# Patient Record
Sex: FEMALE | Race: WHITE | NOT HISPANIC OR LATINO | Employment: OTHER | URBAN - METROPOLITAN AREA
[De-identification: names, ages, dates, MRNs, and addresses within clinical notes are randomized per-mention and may not be internally consistent; named-entity substitution may affect disease eponyms.]

---

## 2017-01-11 ENCOUNTER — OFFICE VISIT (OUTPATIENT)
Dept: CARDIOLOGY | Facility: CLINIC | Age: 82
End: 2017-01-11

## 2017-01-11 VITALS
SYSTOLIC BLOOD PRESSURE: 146 MMHG | BODY MASS INDEX: 18.95 KG/M2 | DIASTOLIC BLOOD PRESSURE: 74 MMHG | HEART RATE: 77 BPM | HEIGHT: 62 IN | WEIGHT: 103 LBS

## 2017-01-11 DIAGNOSIS — I48.0 PAROXYSMAL ATRIAL FIBRILLATION (HCC): Primary | ICD-10-CM

## 2017-01-11 DIAGNOSIS — E78.00 HYPERCHOLESTEROLEMIA: ICD-10-CM

## 2017-01-11 DIAGNOSIS — I10 ESSENTIAL HYPERTENSION: ICD-10-CM

## 2017-01-11 DIAGNOSIS — I45.9 CONDUCTION DISORDER OF THE HEART: ICD-10-CM

## 2017-01-11 PROCEDURE — 99213 OFFICE O/P EST LOW 20 MIN: CPT | Performed by: INTERNAL MEDICINE

## 2017-01-11 PROCEDURE — 93288 INTERROG EVL PM/LDLS PM IP: CPT | Performed by: INTERNAL MEDICINE

## 2017-01-11 NOTE — PROGRESS NOTES
Kristina Muhammad  12/3/1927  [phone]  [phone]    01/11/2017    Anais Almeida MD    Chief Complaint   Patient presents with   • Atrial Fibrillation     PROBLEM LIST:  1. Atrial fibrillation:  a. Abnormal EKG, 06/19/2012, revealing atrial fibrillation at a rate of 104.  b. Initiation of metoprolol  ER 25 mg on 06/19/2012.  c. EKG, 07/09/2012, revealing sinus bradycardia with a first-degree AV block and a right bundle branch block at a rate of 46.  2.  Conduction system disease:  a. Third-degree AV block, 08/31/2014.  b.  Presentation to [facility] where she had implantation of [employer] medical [employer]  2240 pacemaker, data deficit.    c. Cardiolite PET scan, 09/11/2014:  LVEF 70% with no evidence of ischemia.    d. Echocardiogram, 09/01/2014, Dr. Montelongo:   LVEF 77% with moderate MR.  3. Hypertension.  4. Hyperlipidemia.  5.  Hypothyroidism, on chronic replacement therapy.  6. Osteoporosis.  7. Dermatomyositis.  8. Depression.  9. Status post right colectomy in 1991.  10. Status post basal cell carcinoma removal from the face.      Allergies   Allergen Reactions   • Propafenone      150 mg every 12 hours, fatigue.       Current Medications:      Current Outpatient Prescriptions:   •  amLODIPine (NORVASC) 5 MG tablet, TK 1 T PO  QD, Disp: , Rfl: 1  •  levothyroxine (SYNTHROID, LEVOTHROID) 75 MCG tablet, TK 1 T PO QD, Disp: , Rfl: 3  •  losartan (COZAAR) 100 MG tablet, TAKE 1 TABLET BY MOUTH DAILY, Disp: 90 tablet, Rfl: 0  •  Multiple Minerals-Vitamins (CITRACAL PLUS BONE DENSITY PO), Take by mouth daily, Disp: , Rfl:   •  Multiple Vitamins-Minerals (CENTRUM SILVER ADULT 50+) tablet, Take by mouth daily, Disp: , Rfl:   •  propafenone (RHTHYMOL) 150 MG tablet, TAKE 1/2 TABLET BY MOUTH TWICE DAILY, Disp: 90 tablet, Rfl: 1  •  XARELTO 15 MG tablet, TAKE 1 TABLET BY MOUTH DAILY, Disp: 90 tablet, Rfl: 1    HPI    Ms. Muhammad presents today for [employer] device interrogation and 6 month follow up for  "atrial fibrillation, conduction system disease, hypertension, and hyperlipidemia . Since last visit, she has been doing very well and is without cardiac comlaints. She denies chest pain, palpitations, shortness of breath, edema, PND, orthopnea, dizziness, and syncope. She walks when it is warm and tries to stay as active as she can in the winter months.  Her interrogation today was unremarkable.  Since she did not have an EKG last visit, we will obtain one today to measure her intervals since she is on Rhythmol.      The following portions of the patient's history were reviewed and updated as appropriate: allergies, current medications and problem list.    Pertinent positives as listed in the HPI.  All other systems reviewed are negative.    Vitals:    01/11/17 1335   BP: 146/74   BP Location: Right arm   Patient Position: Sitting   Pulse: 77   Weight: 103 lb (46.7 kg)   Height: 62\" (157.5 cm)       General: Alert and oriented  Neck: Jugular venous pressure is within normal limits. Carotids have normal upstrokes without bruits.   Cardiovascular: Heart has a nondisplaced focal PMI. Regular rate and rhythm without murmur, gallop or rub.  Lungs: Clear without rales or wheezes. Equal expansion is noted.   Extremities: Show no edema.  Skin: warm and dry.    Diagnostic Data:    ECG 12 Lead  Date/Time: 1/11/2017 4:42 PM  Performed by: LOC MILLS  Authorized by: LOC MILLS   Comparison: compared with previous ECG from 5/11/2014  Comparison to previous ECG: QRS slightly wider, but stable  Rhythm: sinus rhythm  Rate: normal  BPM: 74  Clinical impression: abnormal ECG  Comments: 1st degree AV block, RBBB         DEVICE INTERROGATION:  1/11/2017, [employer] Assurity: RA pacing 22%, RV pacing 9.5%. P wave is 1.0 mV with a threshold of 0.75 V at 0.5 msec and an impedance of 400 ohms. R wave is >12 mV with a threshold of 0.75 V at 0.5 msec and an impedance of 600 ohms. Battery voltage is 3.02V at 11.7 " years.      Assessment:      ICD-10-CM ICD-9-CM   1. Paroxysmal atrial fibrillation-stable I48.0 427.31   2. Conduction system disease I45.9 426.9   3. Essential hypertension- controlled; no change from last visit I10 401.9   4. Hypercholesterolemia- per PCP E78.00 272.0       Plan:    1. Continue current medications.  2. F/up in 6 months with [employer] and EKG or sooner if needed.    Scribed for Lori Mejia MD by PETTY Miller. 1/11/2017  1:43 PM        I Lori Mejia MD personally performed the services described in this documentation as scribed by the above individual in my presence, and it is both accurate and complete.

## 2017-01-11 NOTE — MR AVS SNAPSHOT
Kristina GALVAN Muhammad   1/11/2017 1:45 PM   Office Visit    Dept Phone:  [phone]   Encounter #:  [id number]    Provider:  Lori Mejia MD   Department:  [facility] CARDIOLOGY                Your Full Care Plan              Your Updated Medication List          This list is accurate as of: 1/11/17  2:39 PM.  Always use your most recent med list.                amLODIPine 5 MG tablet   Commonly known as:  NORVASC       CENTRUM SILVER ADULT 50+ tablet       CITRACAL PLUS BONE DENSITY PO       levothyroxine 75 MCG tablet   Commonly known as:  SYNTHROID, LEVOTHROID       losartan 100 MG tablet   Commonly known as:  COZAAR   TAKE 1 TABLET BY MOUTH DAILY       propafenone 150 MG tablet   Commonly known as:  RHTHYMOL   TAKE 1/2 TABLET BY MOUTH TWICE DAILY       XARELTO 15 MG tablet   Generic drug:  rivaroxaban   TAKE 1 TABLET BY MOUTH DAILY               You Were Diagnosed With        Codes Comments    Paroxysmal atrial fibrillation    -  Primary ICD-10-CM: I48.0  ICD-9-CM: 427.31     Conduction disorder of the heart     ICD-10-CM: I45.9  ICD-9-CM: 426.9     Essential hypertension     ICD-10-CM: I10  ICD-9-CM: 401.9     Hypercholesterolemia     ICD-10-CM: E78.00  ICD-9-CM: 272.0       Instructions     None    Patient Instructions History      Upcoming Appointments     Visit Type Date Time Department    FOLLOW UP 1/11/2017  1:45 PM [facility]    FOLLOW UP 7/27/2017  1:30 PM [facility]      MyChart Signup     Our records indicate that you have declined [facility] ePatientFinderhart signup. If you would like to sign up for ePatientFinderhart, please email [email or web address] or call [phone] to obtain an activation code.             Other Info from Your Visit           Your Appointments     Jul 27, 2017  1:30 PM EDT   Follow Up with Lori Mejia MD   [facility] CARDIOLOGY (--)    [address] [place], [place] [address]  "[address] [place] [id number] [phone]           Arrive 15 minutes prior to appointment.              Allergies     Propafenone      150 mg every 12 hours, fatigue.      Reason for Visit     Atrial Fibrillation           Vital Signs     Blood Pressure Pulse Height Weight Body Mass Index Smoking Status    146/74 (BP Location: Right arm, Patient Position: Sitting) 77 62\" (157.5 cm) 103 lb (46.7 kg) 18.84 kg/m2 Never Smoker      Problems and Diagnoses Noted     Atrial fibrillation (irregular heartbeat)    Conduction disorder of the heart    High blood pressure    High cholesterol            "

## 2017-01-11 NOTE — LETTER
January 11, 2017     Anais Almeida MD  [address]  [address]  [facility] [address]    Patient: Kristina Muhammad   YOB: 1927   Date of Visit: 1/11/2017       Dear Dr. Juan Pablo MD:    Thank you for referring Kristina Muhammad to me for evaluation. Below are the relevant portions of my assessment and plan of care.    If you have questions, please do not hesitate to call me. I look forward to following Kristina along with you.         Sincerely,        Lori Mejia MD        CC: No Recipients  Lori Mejia MD  1/12/2017  4:32 PM  Signed  Kristina Muhammad  12/3/1927  [phone]  [phone]    01/11/2017    Anais Almeida MD    Chief Complaint   Patient presents with   • Atrial Fibrillation     PROBLEM LIST:  1. Atrial fibrillation:  a. Abnormal EKG, 06/19/2012, revealing atrial fibrillation at a rate of 104.  b. Initiation of metoprolol  ER 25 mg on 06/19/2012.  c. EKG, 07/09/2012, revealing sinus bradycardia with a first-degree AV block and a right bundle branch block at a rate of 46.  2.  Conduction system disease:  a. Third-degree AV block, 08/31/2014.  b.  Presentation to [facility] where she had implantation of [employer] medical [employer]  2240 pacemaker, data deficit.    c. Cardiolite PET scan, 09/11/2014:  LVEF 70% with no evidence of ischemia.    d. Echocardiogram, 09/01/2014, Dr. Montelongo:   LVEF 77% with moderate MR.  3. Hypertension.  4. Hyperlipidemia.  5.  Hypothyroidism, on chronic replacement therapy.  6. Osteoporosis.  7. Dermatomyositis.  8. Depression.  9. Status post right colectomy in 1991.  10. Status post basal cell carcinoma removal from the face.      Allergies   Allergen Reactions   • Propafenone      150 mg every 12 hours, fatigue.       Current Medications:      Current Outpatient Prescriptions:   •  amLODIPine (NORVASC) 5 MG tablet, TK 1 T PO  QD, Disp: , Rfl: 1  •  levothyroxine (SYNTHROID, LEVOTHROID) 75 MCG tablet, TK 1 T PO QD,  "Disp: , Rfl: 3  •  losartan (COZAAR) 100 MG tablet, TAKE 1 TABLET BY MOUTH DAILY, Disp: 90 tablet, Rfl: 0  •  Multiple Minerals-Vitamins (CITRACAL PLUS BONE DENSITY PO), Take by mouth daily, Disp: , Rfl:   •  Multiple Vitamins-Minerals (CENTRUM SILVER ADULT 50+) tablet, Take by mouth daily, Disp: , Rfl:   •  propafenone (RHTHYMOL) 150 MG tablet, TAKE 1/2 TABLET BY MOUTH TWICE DAILY, Disp: 90 tablet, Rfl: 1  •  XARELTO 15 MG tablet, TAKE 1 TABLET BY MOUTH DAILY, Disp: 90 tablet, Rfl: 1    HPI    Ms. Muhammad presents today for [employer] device interrogation and 6 month follow up for atrial fibrillation, conduction system disease, hypertension, and hyperlipidemia . Since last visit, she has been doing very well and is without cardiac comlaints. She denies chest pain, palpitations, shortness of breath, edema, PND, orthopnea, dizziness, and syncope. She walks when it is warm and tries to stay as active as she can in the winter months.  Her interrogation today was unremarkable.  Since she did not have an EKG last visit, we will obtain one today to measure her intervals since she is on Rhythmol.      The following portions of the patient's history were reviewed and updated as appropriate: allergies, current medications and problem list.    Pertinent positives as listed in the HPI.  All other systems reviewed are negative.    Vitals:    01/11/17 1335   BP: 146/74   BP Location: Right arm   Patient Position: Sitting   Pulse: 77   Weight: 103 lb (46.7 kg)   Height: 62\" (157.5 cm)       General: Alert and oriented  Neck: Jugular venous pressure is within normal limits. Carotids have normal upstrokes without bruits.   Cardiovascular: Heart has a nondisplaced focal PMI. Regular rate and rhythm without murmur, gallop or rub.  Lungs: Clear without rales or wheezes. Equal expansion is noted.   Extremities: Show no edema.  Skin: warm and dry.    Diagnostic Data:    ECG 12 Lead  Date/Time: 1/11/2017 4:42 PM  Performed by: LOC MILLS " V  Authorized by: [facility]ARD   Comparison: compared with previous ECG from 5/11/2014  Comparison to previous ECG: QRS slightly wider, but stable  Rhythm: sinus rhythm  Rate: normal  BPM: 74  Clinical impression: abnormal ECG  Comments: 1st degree AV block, RBBB         DEVICE INTERROGATION:  1/11/2017, [employer] Ass[employer]: RA pacing 22%, RV pacing 9.5%. P wave is 1.0 mV with a threshold of 0.75 V at 0.5 msec and an impedance of 400 ohms. R wave is >12 mV with a threshold of 0.75 V at 0.5 msec and an impedance of 600 ohms. Battery voltage is 3.02V at 11.7 years.      Assessment:      ICD-10-CM ICD-9-CM   1. Paroxysmal atrial fibrillation-stable I48.0 427.31   2. Conduction system disease I45.9 426.9   3. Essential hypertension- controlled; no change from last visit I10 401.9   4. Hypercholesterolemia- per PCP E78.00 272.0       Plan:    1. Continue current medications.  2. F/up in 6 months with [employer] and EKG or sooner if needed.    Scribed for Lori Mejia MD by Loc Maynard, PETTY. 1/11/2017  1:43 PM        I Lori Mejia MD personally performed the services described in this documentation as scribed by the above individual in my presence, and it is both accurate and complete.

## 2017-01-25 RX ORDER — LOSARTAN POTASSIUM 100 MG/1
TABLET ORAL
Qty: 90 TABLET | Refills: 3 | Status: SHIPPED | OUTPATIENT
Start: 2017-01-25 | End: 2018-02-22 | Stop reason: DRUGHIGH

## 2017-04-14 RX ORDER — PROPAFENONE HYDROCHLORIDE 150 MG/1
TABLET, COATED ORAL
Qty: 90 TABLET | Refills: 0 | Status: SHIPPED | OUTPATIENT
Start: 2017-04-14 | End: 2017-07-12 | Stop reason: SDUPTHER

## 2017-04-26 ENCOUNTER — CLINICAL SUPPORT NO REQUIREMENTS (OUTPATIENT)
Dept: CARDIOLOGY | Facility: CLINIC | Age: 82
End: 2017-04-26

## 2017-04-26 DIAGNOSIS — I48.0 PAROXYSMAL ATRIAL FIBRILLATION (HCC): ICD-10-CM

## 2017-04-26 DIAGNOSIS — I45.9 CONDUCTION DISORDER OF THE HEART: ICD-10-CM

## 2017-04-26 PROCEDURE — 93294 REM INTERROG EVL PM/LDLS PM: CPT | Performed by: INTERNAL MEDICINE

## 2017-04-26 PROCEDURE — 93296 REM INTERROG EVL PM/IDS: CPT | Performed by: INTERNAL MEDICINE

## 2017-06-16 RX ORDER — RIVAROXABAN 15 MG/1
TABLET, FILM COATED ORAL
Qty: 90 TABLET | Refills: 0 | Status: SHIPPED | OUTPATIENT
Start: 2017-06-16 | End: 2017-09-11 | Stop reason: SDUPTHER

## 2017-07-13 RX ORDER — PROPAFENONE HYDROCHLORIDE 150 MG/1
TABLET, COATED ORAL
Qty: 90 TABLET | Refills: 0 | Status: SHIPPED | OUTPATIENT
Start: 2017-07-13 | End: 2017-10-10 | Stop reason: SDUPTHER

## 2017-07-27 ENCOUNTER — OFFICE VISIT (OUTPATIENT)
Dept: CARDIOLOGY | Facility: CLINIC | Age: 82
End: 2017-07-27

## 2017-07-27 VITALS
DIASTOLIC BLOOD PRESSURE: 80 MMHG | HEIGHT: 62 IN | BODY MASS INDEX: 19.47 KG/M2 | WEIGHT: 105.8 LBS | SYSTOLIC BLOOD PRESSURE: 154 MMHG | HEART RATE: 78 BPM

## 2017-07-27 DIAGNOSIS — I45.9 CONDUCTION DISORDER OF THE HEART: ICD-10-CM

## 2017-07-27 DIAGNOSIS — E78.2 MIXED HYPERLIPIDEMIA: ICD-10-CM

## 2017-07-27 DIAGNOSIS — I10 ESSENTIAL HYPERTENSION: ICD-10-CM

## 2017-07-27 DIAGNOSIS — I48.0 PAROXYSMAL ATRIAL FIBRILLATION (HCC): Primary | ICD-10-CM

## 2017-07-27 PROCEDURE — 99213 OFFICE O/P EST LOW 20 MIN: CPT | Performed by: INTERNAL MEDICINE

## 2017-07-27 PROCEDURE — 93288 INTERROG EVL PM/LDLS PM IP: CPT | Performed by: INTERNAL MEDICINE

## 2017-07-27 RX ORDER — AMLODIPINE BESYLATE 2.5 MG/1
2.5 TABLET ORAL DAILY
COMMUNITY

## 2017-07-27 NOTE — PROGRESS NOTES
Kristina Muhammad  12/3/1927  [phone]  [phone]    07/27/2017    Anais Almeida MD    Chief Complaint   Patient presents with   • Atrial Fibrillation       PROBLEM LIST:  1. Atrial fibrillation:  a. Abnormal EKG, 06/19/2012, revealing atrial fibrillation at a rate of 104.  b. Initiation of metoprolol  ER 25 mg on 06/19/2012.  c. EKG, 07/09/2012, revealing sinus bradycardia with a first-degree AV block and a right bundle branch block at a rate of 46.  2.  Conduction system disease:  a. Third-degree AV block, 08/31/2014.  b.  Presentation to [facility] where she had implantation of [employer] medical [employer]  2240 pacemaker, data deficit.    c. Cardiolite PET scan, 09/11/2014:  LVEF 70% with no evidence of ischemia.    d. Echocardiogram, 09/01/2014, Dr. Monteolngo:   LVEF 77% with moderate MR.  3. Hypertension.  4. Hyperlipidemia.  5. Hypothyroidism, on chronic replacement therapy.  6. Osteoporosis.  7. Dermatomyositis.  8. Depression.  9. Status post right colectomy in 1991.  10. Status post basal cell carcinoma removal from the face.      Allergies   Allergen Reactions   • Propafenone      150 mg every 12 hours, fatigue.       Current Medications:    Current Outpatient Prescriptions:   •  amLODIPine (NORVASC) 2.5 MG tablet, Take 2.5 mg by mouth Daily., Disp: , Rfl:   •  levothyroxine (SYNTHROID, LEVOTHROID) 75 MCG tablet, TK 1 T PO QD, Disp: , Rfl: 3  •  losartan (COZAAR) 100 MG tablet, TAKE 1 TABLET BY MOUTH DAILY, Disp: 90 tablet, Rfl: 3  •  Multiple Minerals-Vitamins (CITRACAL PLUS BONE DENSITY PO), Take by mouth daily, Disp: , Rfl:   •  Multiple Vitamins-Minerals (CENTRUM SILVER ADULT 50+) tablet, Take by mouth daily, Disp: , Rfl:   •  Multiple Vitamins-Minerals (PRESERVISION AREDS PO), Take  by mouth 2 (Two) Times a Day., Disp: , Rfl:   •  Probiotic Product (ALIGN PO), Take  by mouth Daily., Disp: , Rfl:   •  propafenone (RHTHYMOL) 150 MG tablet, TAKE 1/2 TABLET BY MOUTH TWICE DAILY, Disp:  "90 tablet, Rfl: 0  •  XARELTO 15 MG tablet, TAKE 1 TABLET BY MOUTH DAILY, Disp: 90 tablet, Rfl: 0    History of Present Illness (HPI):   Kristina Muhammad presents today for a 6 month follow-up with [employer] device interrogation. The patient has a history of atrial fibrillation, conduction system disease, hypertension, and hyperlipidemia. Since last visit, she feels that her heart rate has been stable. The patient has been measuring her blood pressure at home, which has also been running in the 120-130 range. Overall, the patient is doing well from a cardiac standpoint. Patient denies chest pain, palpitations, shortness of breath, edema, PND, orthopnea, dizziness, and syncope. The patient stays physically active by walking. For example, yesterday she walked with her walker for 30 minutes in the courtyard.     The following portions of the patient's history were reviewed and updated as appropriate: allergies, current medications and problem list.    Pertinent positives as listed in the HPI.  All other systems reviewed are negative.    Vitals:    17 1332   BP: 154/80   BP Location: Left arm   Patient Position: Sitting   Pulse: 78   Weight: 105 lb 12.8 oz (48 kg)   Height: 62\" (157.5 cm)       Physical Exam:   General: Alert, awake, and oriented. Well-developed, well-nourished; in no acute distress.  Neck: Jugular venous pressure is within normal limits. Carotids have normal upstrokes without bruits.   Cardiovascular: Heart has a nondisplaced focal PMI. Regular rate and rhythm without murmur, gallop or rub.  Lungs: Clear without rales or wheezes. Equal expansion is noted.   Extremities: Show no edema.   Skin: Warm and dry.  Neurologic: Non-focal.  Peripheral vascular:  Posterior tibial and dorsalis pedis pulses are 2+ and symmetrical. There is no peripheral edema.     Diagnostic Data:  Lipid Panel (2017)  TC: 135  Tri  HDL: 49  LDL: 75          ECG 12 Lead  Date/Time: 2017 1:49 PM  Performed by: " "GILDARDO STORY  Authorized by: GILDARDO STORY   Rhythm: sinus rhythm and A-V block  BPM: 76  Conduction: right bundle branch block, LAFB and bifascicular block  Other findings comments: Voltage criteria for LVH  Clinical impression: abnormal ECG            DEVICE INTERROGATION:  7/27/2017, [employer] PPM, 2240: RA pacing 23%, RV pacing 10%. P wave is 1.2 mV with a threshold of 0.62 V at 0.5 msec and an impedance of 390 ohms. R wave is >12 mV with a threshold of 0.5 V at 0.6 msec and an impedance of 680 ohms. Battery voltage is 3.02, 11.6 years. Events: Brief [facility] conduction.      Assessment:    ICD-10-CM ICD-9-CM   1. Paroxysmal atrial fibrillation I48.0 427.31   2. Conduction system disease I45.9 426.9   3. Essential hypertension I10 401.9   4. Mixed hyperlipidemia E78.2 272.2       Plan:  1. Continue \"current medications\" as listed above.  2. Follow-up in 6 months or sooner, if needed.      Scribed for Gildardo Story MD by Sofía Dominguez. 7/27/2017  1:52 PM I Gildardo Story MD personally performed the services described in this documentation as scribed by the above individual in my presence, and it is both accurate and complete.    Gildardo Story MD, FACC    I Gildardo Story MD personally performed the services described in this documentation as scribed by the above individual in my presence, and it is both accurate and complete.    Gildardo Story MD, FACC    "

## 2017-09-11 RX ORDER — RIVAROXABAN 15 MG/1
TABLET, FILM COATED ORAL
Qty: 90 TABLET | Refills: 1 | Status: SHIPPED | OUTPATIENT
Start: 2017-09-11 | End: 2018-02-28 | Stop reason: SDUPTHER

## 2017-10-10 RX ORDER — PROPAFENONE HYDROCHLORIDE 150 MG/1
TABLET, COATED ORAL
Qty: 90 TABLET | Refills: 0 | Status: SHIPPED | OUTPATIENT
Start: 2017-10-10 | End: 2017-12-26 | Stop reason: SDUPTHER

## 2017-12-26 RX ORDER — PROPAFENONE HYDROCHLORIDE 150 MG/1
TABLET, COATED ORAL
Qty: 90 TABLET | Refills: 1 | Status: SHIPPED | OUTPATIENT
Start: 2017-12-26 | End: 2019-04-24

## 2018-02-22 ENCOUNTER — OFFICE VISIT (OUTPATIENT)
Dept: CARDIOLOGY | Facility: CLINIC | Age: 83
End: 2018-02-22

## 2018-02-22 ENCOUNTER — HOSPITAL ENCOUNTER (OUTPATIENT)
Dept: CARDIOLOGY | Facility: HOSPITAL | Age: 83
Discharge: HOME | End: 2018-02-22
Attending: INTERNAL MEDICINE | Admitting: INTERNAL MEDICINE

## 2018-02-22 VITALS
HEART RATE: 68 BPM | WEIGHT: 108.6 LBS | BODY MASS INDEX: 19.98 KG/M2 | SYSTOLIC BLOOD PRESSURE: 162 MMHG | DIASTOLIC BLOOD PRESSURE: 84 MMHG | HEIGHT: 62 IN

## 2018-02-22 DIAGNOSIS — I10 ESSENTIAL HYPERTENSION: ICD-10-CM

## 2018-02-22 DIAGNOSIS — E78.2 MIXED HYPERLIPIDEMIA: ICD-10-CM

## 2018-02-22 DIAGNOSIS — R60.9 EDEMA, UNSPECIFIED TYPE: ICD-10-CM

## 2018-02-22 DIAGNOSIS — I48.0 PAROXYSMAL ATRIAL FIBRILLATION (HCC): Primary | ICD-10-CM

## 2018-02-22 LAB
BH CV LOWER VASCULAR LEFT COMMON FEMORAL AUGMENT: NORMAL
BH CV LOWER VASCULAR LEFT COMMON FEMORAL COMPRESS: NORMAL
BH CV LOWER VASCULAR LEFT COMMON FEMORAL PHASIC: NORMAL
BH CV LOWER VASCULAR LEFT COMMON FEMORAL SPONT: NORMAL
BH CV LOWER VASCULAR LEFT DISTAL FEMORAL COMPRESS: NORMAL
BH CV LOWER VASCULAR LEFT GASTRONEMIUS COMPRESS: NORMAL
BH CV LOWER VASCULAR LEFT GREATER SAPH AK COMPRESS: NORMAL
BH CV LOWER VASCULAR LEFT GREATER SAPH BK COMPRESS: NORMAL
BH CV LOWER VASCULAR LEFT LESSER SAPH COMPRESS: NORMAL
BH CV LOWER VASCULAR LEFT MID FEMORAL AUGMENT: NORMAL
BH CV LOWER VASCULAR LEFT MID FEMORAL COMPRESS: NORMAL
BH CV LOWER VASCULAR LEFT MID FEMORAL PHASIC: NORMAL
BH CV LOWER VASCULAR LEFT MID FEMORAL SPONT: NORMAL
BH CV LOWER VASCULAR LEFT PERONEAL COMPRESS: NORMAL
BH CV LOWER VASCULAR LEFT POPLITEAL AUGMENT: NORMAL
BH CV LOWER VASCULAR LEFT POPLITEAL COMPRESS: NORMAL
BH CV LOWER VASCULAR LEFT POPLITEAL PHASIC: NORMAL
BH CV LOWER VASCULAR LEFT POPLITEAL SPONT: NORMAL
BH CV LOWER VASCULAR LEFT POSTERIOR TIBIAL COMPRESS: NORMAL
BH CV LOWER VASCULAR LEFT PROFUNDA FEMORAL AUGMENT: NORMAL
BH CV LOWER VASCULAR LEFT PROFUNDA FEMORAL COMPRESS: NORMAL
BH CV LOWER VASCULAR LEFT PROFUNDA FEMORAL PHASIC: NORMAL
BH CV LOWER VASCULAR LEFT PROFUNDA FEMORAL SPONT: NORMAL
BH CV LOWER VASCULAR LEFT PROXIMAL FEMORAL COMPRESS: NORMAL
BH CV LOWER VASCULAR LEFT SAPHENOFEMORAL JUNCTION AUGMENT: NORMAL
BH CV LOWER VASCULAR LEFT SAPHENOFEMORAL JUNCTION COMPRESS: NORMAL
BH CV LOWER VASCULAR LEFT SAPHENOFEMORAL JUNCTION PHASIC: NORMAL
BH CV LOWER VASCULAR LEFT SAPHENOFEMORAL JUNCTION SPONT: NORMAL
BH CV LOWER VASCULAR RIGHT COMMON FEMORAL AUGMENT: NORMAL
BH CV LOWER VASCULAR RIGHT COMMON FEMORAL COMPRESS: NORMAL
BH CV LOWER VASCULAR RIGHT COMMON FEMORAL PHASIC: NORMAL
BH CV LOWER VASCULAR RIGHT COMMON FEMORAL SPONT: NORMAL

## 2018-02-22 PROCEDURE — 99214 OFFICE O/P EST MOD 30 MIN: CPT | Performed by: INTERNAL MEDICINE

## 2018-02-22 PROCEDURE — 93971 EXTREMITY STUDY: CPT | Performed by: INTERNAL MEDICINE

## 2018-02-22 PROCEDURE — 93280 PM DEVICE PROGR EVAL DUAL: CPT | Performed by: INTERNAL MEDICINE

## 2018-02-22 PROCEDURE — 93971 EXTREMITY STUDY: CPT

## 2018-02-22 RX ORDER — MULTIVIT-MIN/IRON/FOLIC ACID/K 18-600-40
2000 CAPSULE ORAL DAILY
COMMUNITY
End: 2018-09-13

## 2018-02-22 RX ORDER — ASPIRIN 81 MG/1
81 TABLET ORAL DAILY
COMMUNITY

## 2018-02-22 RX ORDER — LOSARTAN POTASSIUM 50 MG/1
50 TABLET ORAL DAILY
COMMUNITY

## 2018-02-22 NOTE — PROGRESS NOTES
Kristina Muhammad  12/3/1927  [phone]  [phone]    02/22/2018    Anais Almeida MD    Chief Complaint   Patient presents with   • Atrial Fibrillation     PROBLEM LIST:  1. Atrial fibrillation:  a. Abnormal EKG, 06/19/2012, revealing atrial fibrillation at a rate of 104.  b. Initiation of metoprolol  ER 25 mg on 06/19/2012.  c. EKG, 07/09/2012, revealing sinus bradycardia with a first-degree AV block and a right bundle branch block at a rate of 46.  2.  Conduction system disease:  a. Third-degree AV block, 08/31/2014.  b.  Presentation to [facility] where she had implantation of [employer] medical [employer]  2240 pacemaker, data deficit.    c. Cardiolite PET scan, 09/11/2014:  LVEF 70% with no evidence of ischemia.    d. Echocardiogram, 09/01/2014, Dr. Montelongo:   LVEF 77% with moderate MR.  3. Hypertension.  4. Hyperlipidemia.  5. Hypothyroidism, on chronic replacement therapy.  6. Osteoporosis.  7. Dermatomyositis.  8. Depression.  9. Status post right colectomy in 1991.  10. Status post basal cell carcinoma removal from the face.      Allergies   Allergen Reactions   • Propafenone      150 mg every 12 hours, fatigue.       Current Medications:      Current Outpatient Prescriptions:   •  amLODIPine (NORVASC) 2.5 MG tablet, Take 2.5 mg by mouth Daily., Disp: , Rfl:   •  aspirin 81 MG EC tablet, Take 81 mg by mouth Daily., Disp: , Rfl:   •  Cholecalciferol (VITAMIN D) 2000 units capsule, Take 2,000 Units by mouth Daily., Disp: , Rfl:   •  levothyroxine (SYNTHROID, LEVOTHROID) 75 MCG tablet, TK 1 T PO QD, Disp: , Rfl: 3  •  losartan (COZAAR) 50 MG tablet, Take 50 mg by mouth Daily., Disp: , Rfl:   •  Multiple Minerals-Vitamins (CITRACAL PLUS BONE DENSITY PO), Take by mouth daily, Disp: , Rfl:   •  Multiple Vitamins-Minerals (CENTRUM SILVER ADULT 50+) tablet, Take by mouth daily, Disp: , Rfl:   •  Multiple Vitamins-Minerals (PRESERVISION AREDS PO), Take  by mouth 2 (Two) Times a Day., Disp: , Rfl:  "  •  Probiotic Product (ALIGN PO), Take  by mouth Daily., Disp: , Rfl:   •  propafenone (RYTHMOL) 150 MG tablet, TAKE 1/2 TABLET BY MOUTH TWICE DAILY, Disp: 90 tablet, Rfl: 1  •  XARELTO 15 MG tablet, TAKE 1 TABLET BY MOUTH DAILY, Disp: 90 tablet, Rfl: 1    HPI    Kristina Muhammad presents today for 6 month follow up of paroxysmal atrial fibrillation, hypertension, and hyperlipidemia. Since last visit, patient has been feeling well overall from a cardiovascular standpoint. She monitors her blood pressure at home and notes it typically runs 130s systolic over 70s diastolic. Patient denies chest pain, palpitations, shortness of breath, PND, orthopnea, dizziness, and syncope. She has been routinely exercising by walking. She subsequently aggravated her left lower extremity from over-exertion. She states that it does not hurt, however, but it is swollen. She notes having one instance of falling down over the past week which lead to a large bruise on her right upper extremity.    The following portions of the patient's history were reviewed and updated as appropriate: allergies, current medications and problem list.    Pertinent positives as listed in the HPI.  All other systems reviewed are negative.    Vitals:    02/22/18 1324   BP: 162/84   BP Location: Left arm   Patient Position: Sitting   Pulse: 68   Weight: 49.3 kg (108 lb 9.6 oz)   Height: 157.5 cm (62\")       Physical Exam:  General: Alert and oriented to person, place, and time.  Neck: Jugular venous pressure is within normal limits. Carotids have normal upstrokes without bruits.   Cardiovascular: Regular rate and rhythm without murmur gallop or rub.  Lungs: Clear without rales or wheezes. Equal expansion is noted.   Extremities: Show trace edema.  Skin: warm and dry.  Neurologic: nonfocal    Diagnostic Data:      ECG 12 Lead  Date/Time: 2/22/2018 1:46 PM  Performed by: GILDARDO STORY  Authorized by: GILDARDO STORY   Rhythm: sinus rhythm and A-V " block  BPM: 68  Conduction: right bundle branch block and LAFB  Other findings: LVH  Clinical impression: abnormal ECG             DEVICE INTERROGATION:  2/22/2018, [employer] PPM: RA pacing 22%, RV pacing 15%. P wave is 0.8 mV with a threshold of 0.75 V at 0.5 msec and an impedance of 380 ohms. R wave is >12 mV with a threshold of 0.75 V at 0.5 msec and an impedance of 640 ohms. Battery voltage is 3.01 V, 11.5 years. <1% mode switch, longest - 3 hours 49 minutes 9/10/2017      Assessment:      ICD-10-CM ICD-9-CM   1. Paroxysmal atrial fibrillation I48.0 427.31   2. Essential hypertension I10 401.9   3. Mixed hyperlipidemia E78.2 272.2       Plan:    1. Left lower extremity venous duplex to evaluate the edema in her lower left leg.  2. Recommend elevating lower extremities.  3. Continue current medications.  4. F/up in 6 months or sooner if needed.    Scribed for Lori Mejia MD by Jorge Alberto Serna. 2/22/2018  1:55 PM     I Lori Mejia MD personally performed the services described in this documentation as scribed by the above individual in my presence, and it is both accurate and complete.    Lori Mejia MD, FACC

## 2018-02-28 RX ORDER — RIVAROXABAN 15 MG/1
TABLET, FILM COATED ORAL
Qty: 90 TABLET | Refills: 3 | Status: SHIPPED | OUTPATIENT
Start: 2018-02-28

## 2018-04-27 PROCEDURE — 93294 REM INTERROG EVL PM/LDLS PM: CPT | Performed by: INTERNAL MEDICINE

## 2018-04-27 PROCEDURE — 93296 REM INTERROG EVL PM/IDS: CPT | Performed by: INTERNAL MEDICINE

## 2018-04-30 ENCOUNTER — CLINICAL SUPPORT NO REQUIREMENTS (OUTPATIENT)
Dept: CARDIOLOGY | Facility: CLINIC | Age: 83
End: 2018-04-30

## 2018-04-30 DIAGNOSIS — I48.0 PAROXYSMAL ATRIAL FIBRILLATION (HCC): ICD-10-CM

## 2018-07-31 ENCOUNTER — CLINICAL SUPPORT NO REQUIREMENTS (OUTPATIENT)
Dept: CARDIOLOGY | Facility: CLINIC | Age: 83
End: 2018-07-31

## 2018-07-31 DIAGNOSIS — I44.2 COMPLETE HEART BLOCK (HCC): ICD-10-CM

## 2018-07-31 PROCEDURE — 93294 REM INTERROG EVL PM/LDLS PM: CPT | Performed by: INTERNAL MEDICINE

## 2018-07-31 PROCEDURE — 93296 REM INTERROG EVL PM/IDS: CPT | Performed by: INTERNAL MEDICINE

## 2018-09-13 ENCOUNTER — OFFICE VISIT (OUTPATIENT)
Dept: CARDIOLOGY | Facility: CLINIC | Age: 83
End: 2018-09-13

## 2018-09-13 VITALS
DIASTOLIC BLOOD PRESSURE: 60 MMHG | SYSTOLIC BLOOD PRESSURE: 136 MMHG | WEIGHT: 104 LBS | HEART RATE: 60 BPM | HEIGHT: 62 IN | BODY MASS INDEX: 19.14 KG/M2

## 2018-09-13 DIAGNOSIS — I10 ESSENTIAL HYPERTENSION: ICD-10-CM

## 2018-09-13 DIAGNOSIS — I48.0 PAROXYSMAL ATRIAL FIBRILLATION (HCC): Primary | ICD-10-CM

## 2018-09-13 DIAGNOSIS — E78.2 MIXED HYPERLIPIDEMIA: ICD-10-CM

## 2018-09-13 PROCEDURE — 99213 OFFICE O/P EST LOW 20 MIN: CPT | Performed by: INTERNAL MEDICINE

## 2018-09-13 PROCEDURE — 93280 PM DEVICE PROGR EVAL DUAL: CPT | Performed by: INTERNAL MEDICINE

## 2018-09-13 RX ORDER — POLYETHYLENE GLYCOL 3350 17 G/17G
17 POWDER, FOR SOLUTION ORAL DAILY
COMMUNITY

## 2018-09-13 RX ORDER — CALCITONIN SALMON 200 [IU]/.09ML
1 SPRAY, METERED NASAL DAILY
COMMUNITY

## 2018-09-13 RX ORDER — DONEPEZIL HYDROCHLORIDE 5 MG/1
5 TABLET, FILM COATED ORAL NIGHTLY
COMMUNITY

## 2018-09-13 NOTE — PROGRESS NOTES
Kristina Muhammad  12/3/1927  [phone]  [phone]    09/13/2018    Anais Almeida MD    Chief Complaint   Patient presents with   • Atrial Fibrillation       Problem List:  1. Atrial fibrillation:  a. Abnormal EKG, 06/19/2012, revealing atrial fibrillation at a rate of 104.  b. Initiation of metoprolol  ER 25 mg on 06/19/2012.  c. EKG, 07/09/2012, revealing sinus bradycardia with a first-degree AV block and a right bundle branch block at a rate of 46.  2.  Conduction system disease:  a. Third-degree AV block, 08/31/2014.  b.  Presentation to [facility] where she had implantation of [employer] medical Assurity  2240 pacemaker, data deficit.    c. Cardiolite PET scan, 09/11/2014:  LVEF 70% with no evidence of ischemia.    d. Echocardiogram, 09/01/2014, Dr. Montelongo:   LVEF 77% with moderate MR.  3. Hypertension.  4. Hyperlipidemia.  5. Hypothyroidism, on chronic replacement therapy.  6. Osteoporosis.  7. Dermatomyositis.  8. Depression.  9. Status post right colectomy in 1991.  10. Status post basal cell carcinoma removal from the face.    Allergies   Allergen Reactions   • Propafenone      150 mg every 12 hours, fatigue.       Current Medications:      Current Outpatient Prescriptions:   •  Acetaminophen (TYLENOL ARTHRITIS PAIN PO), Take 1 tablet by mouth As Needed., Disp: , Rfl:   •  amLODIPine (NORVASC) 2.5 MG tablet, Take 2.5 mg by mouth Daily., Disp: , Rfl:   •  aspirin 81 MG EC tablet, Take 81 mg by mouth Daily., Disp: , Rfl:   •  calcitonin, salmon, (MIACALCIN) 200 UNIT/ACT nasal spray, 1 spray by Alternating Nares route Daily., Disp: , Rfl:   •  donepezil (ARICEPT) 5 MG tablet, Take 5 mg by mouth Every Night., Disp: , Rfl:   •  levothyroxine (SYNTHROID, LEVOTHROID) 75 MCG tablet, TK 1 T PO QD, Disp: , Rfl: 3  •  losartan (COZAAR) 50 MG tablet, Take 50 mg by mouth Daily., Disp: , Rfl:   •  Multiple Minerals-Vitamins (CITRACAL PLUS BONE DENSITY PO), Take by mouth daily, Disp: , Rfl:   •   "Multiple Vitamins-Minerals (CENTRUM SILVER ADULT 50+) tablet, Take by mouth daily, Disp: , Rfl:   •  Multiple Vitamins-Minerals (PRESERVISION AREDS PO), Take  by mouth 2 (Two) Times a Day., Disp: , Rfl:   •  polyethylene glycol (MIRALAX) packet, Take 17 g by mouth Daily., Disp: , Rfl:   •  Probiotic Product (ALIGN PO), Take  by mouth Daily., Disp: , Rfl:   •  propafenone (RYTHMOL) 150 MG tablet, TAKE 1/2 TABLET BY MOUTH TWICE DAILY, Disp: 90 tablet, Rfl: 1  •  XARELTO 15 MG tablet, TAKE 1 TABLET BY MOUTH DAILY, Disp: 90 tablet, Rfl: 3    HPI    Kristina Muhammad presents today for 6 month follow up for afib, hypertension and hyperlipidemia. Since last visit, she has been doing well from a cardiac standpoint. Today, she states she has difficulty sleeping. Patient denies chest pain, palpitations, shortness of breath, and edema. She admits to not having a regular physical activity regimen.    The following portions of the patient's history were reviewed and updated as appropriate: allergies, current medications and problem list.    Pertinent positives as listed in the HPI.  All other systems reviewed are negative.    Vitals:    09/13/18 1432   BP: 136/60   BP Location: Right arm   Patient Position: Sitting   Pulse: 60   Weight: 47.2 kg (104 lb)   Height: 157.5 cm (62\")       Physical Exam:    General: Alert and oriented  Neck: Jugular venous pressure is within normal limits. Carotids have normal upstrokes without bruits.   Cardiovascular: Heart has a nondisplaced focal PMI. Regular rate and rhythm without murmur, gallop or rub.  Lungs: Clear without rales or wheezes. Equal expansion is noted.   Extremities: Show no edema.  Skin: warm and dry.  Neurologic: nonfocal    Diagnostic Data:        Procedures  DEVICE INTERROGATION: 9/13/2018, [employer], PPM. Model no. Assurity 2240: RA pacing 37%, RV pacing 76%. P wave is 2.0 mV with a threshold of 0.62 V at 0.5 msec and an impedance of 380 ohms. R wave is none(paced) at 35 bpm " with a threshold of 0.62 V at 0.5 msec and an impedance of 640 ohms. Battery voltage is 3.01. Events: AMS 2.6% 1 day 16 hour.    Assessment:      ICD-10-CM ICD-9-CM   1. Paroxysmal atrial fibrillation (CMS/HCC) I48.0 427.31   2. Essential hypertension I10 401.9   3. Mixed hyperlipidemia E78.2 272.2       Plan:    1. Continue current medications.  2. F/up in 6 months w [facility] or sooner if needed.    Scribed for Lori Mejia MD by Dixie Denney. 9/13/2018  2:42 PM     I Lori Mejia MD personally performed the services described in this documentation as scribed by the above individual in my presence, and it is both accurate and complete.    Lori Mejia MD, FACC

## 2018-11-07 ENCOUNTER — CLINICAL SUPPORT NO REQUIREMENTS (OUTPATIENT)
Dept: CARDIOLOGY | Facility: CLINIC | Age: 83
End: 2018-11-07

## 2018-11-07 DIAGNOSIS — I48.0 PAROXYSMAL ATRIAL FIBRILLATION (HCC): ICD-10-CM

## 2018-11-07 PROCEDURE — 93294 REM INTERROG EVL PM/LDLS PM: CPT | Performed by: INTERNAL MEDICINE

## 2018-11-07 PROCEDURE — 93296 REM INTERROG EVL PM/IDS: CPT | Performed by: INTERNAL MEDICINE

## 2018-11-30 ENCOUNTER — TELEPHONE (OUTPATIENT)
Dept: INTERNAL MEDICINE | Facility: CLINIC | Age: 83
End: 2018-11-30

## 2019-01-01 ENCOUNTER — CLINICAL SUPPORT NO REQUIREMENTS (OUTPATIENT)
Dept: CARDIOLOGY | Facility: CLINIC | Age: 84
End: 2019-01-01

## 2019-01-01 ENCOUNTER — OFFICE VISIT (OUTPATIENT)
Dept: CARDIOLOGY | Facility: CLINIC | Age: 84
End: 2019-01-01

## 2019-01-01 ENCOUNTER — TELEPHONE (OUTPATIENT)
Dept: INTERNAL MEDICINE | Facility: CLINIC | Age: 84
End: 2019-01-01

## 2019-01-01 VITALS
HEIGHT: 62 IN | OXYGEN SATURATION: 96 % | SYSTOLIC BLOOD PRESSURE: 128 MMHG | DIASTOLIC BLOOD PRESSURE: 68 MMHG | HEART RATE: 59 BPM | WEIGHT: 100 LBS | BODY MASS INDEX: 18.4 KG/M2

## 2019-01-01 DIAGNOSIS — I45.9 CONDUCTION DISORDER OF THE HEART: ICD-10-CM

## 2019-01-01 DIAGNOSIS — I48.0 PAROXYSMAL ATRIAL FIBRILLATION (HCC): Primary | ICD-10-CM

## 2019-01-01 DIAGNOSIS — E78.2 MIXED HYPERLIPIDEMIA: ICD-10-CM

## 2019-01-01 DIAGNOSIS — I48.21 PERMANENT ATRIAL FIBRILLATION (HCC): ICD-10-CM

## 2019-01-01 DIAGNOSIS — I10 ESSENTIAL HYPERTENSION: ICD-10-CM

## 2019-01-01 PROCEDURE — 93294 REM INTERROG EVL PM/LDLS PM: CPT | Performed by: INTERNAL MEDICINE

## 2019-01-01 PROCEDURE — 99213 OFFICE O/P EST LOW 20 MIN: CPT | Performed by: INTERNAL MEDICINE

## 2019-01-01 PROCEDURE — 93279 PRGRMG DEV EVAL PM/LDLS PM: CPT | Performed by: INTERNAL MEDICINE

## 2019-01-01 PROCEDURE — 93296 REM INTERROG EVL PM/IDS: CPT | Performed by: INTERNAL MEDICINE

## 2019-02-06 ENCOUNTER — CLINICAL SUPPORT NO REQUIREMENTS (OUTPATIENT)
Dept: CARDIOLOGY | Facility: CLINIC | Age: 84
End: 2019-02-06

## 2019-02-06 DIAGNOSIS — I48.0 PAROXYSMAL ATRIAL FIBRILLATION (HCC): ICD-10-CM

## 2019-02-06 DIAGNOSIS — R00.1 BRADYCARDIA: ICD-10-CM

## 2019-02-06 PROCEDURE — 93294 REM INTERROG EVL PM/LDLS PM: CPT | Performed by: INTERNAL MEDICINE

## 2019-02-06 PROCEDURE — 93296 REM INTERROG EVL PM/IDS: CPT | Performed by: INTERNAL MEDICINE

## 2019-02-28 ENCOUNTER — TELEPHONE (OUTPATIENT)
Dept: CARDIOLOGY | Facility: CLINIC | Age: 84
End: 2019-02-28

## 2019-02-28 NOTE — TELEPHONE ENCOUNTER
Spoke with PT's daughter regarding PT's recent device check- she states that they would like to NOT do ECV at this time- they will keep appt in April as scheduled- Kailyn will call me if they need anything in the meantime-   I called [facility] and spoke with Jennifer Harrison, RN to give her the order to stop propafenone

## 2019-04-18 ENCOUNTER — LAB REQUISITION (OUTPATIENT)
Dept: LAB | Facility: HOSPITAL | Age: 84
End: 2019-04-18

## 2019-04-18 DIAGNOSIS — Z00.00 ROUTINE GENERAL MEDICAL EXAMINATION AT A HEALTH CARE FACILITY: ICD-10-CM

## 2019-04-18 LAB
ALBUMIN SERPL-MCNC: 3.6 G/DL (ref 3.5–5.2)
ALBUMIN/GLOB SERPL: 1.1 G/DL
ALP SERPL-CCNC: 94 U/L (ref 39–117)
ALT SERPL W P-5'-P-CCNC: 13 U/L (ref 1–33)
ANION GAP SERPL CALCULATED.3IONS-SCNC: 10 MMOL/L
AST SERPL-CCNC: 16 U/L (ref 1–32)
BASOPHILS # BLD AUTO: 0.02 10*3/MM3 (ref 0–0.2)
BASOPHILS NFR BLD AUTO: 0.3 % (ref 0–1.5)
BILIRUB SERPL-MCNC: 0.3 MG/DL (ref 0.2–1.2)
BUN BLD-MCNC: 17 MG/DL (ref 8–23)
BUN/CREAT SERPL: 19.5 (ref 7–25)
CALCIUM SPEC-SCNC: 8.6 MG/DL (ref 8.2–9.6)
CHLORIDE SERPL-SCNC: 106 MMOL/L (ref 98–107)
CO2 SERPL-SCNC: 26 MMOL/L (ref 22–29)
CREAT BLD-MCNC: 0.87 MG/DL (ref 0.57–1)
DEPRECATED RDW RBC AUTO: 48 FL (ref 37–54)
EOSINOPHIL # BLD AUTO: 0.17 10*3/MM3 (ref 0–0.4)
EOSINOPHIL NFR BLD AUTO: 2.8 % (ref 0.3–6.2)
ERYTHROCYTE [DISTWIDTH] IN BLOOD BY AUTOMATED COUNT: 15.6 % (ref 12.3–15.4)
GFR SERPL CREATININE-BSD FRML MDRD: 61 ML/MIN/1.73
GLOBULIN UR ELPH-MCNC: 3.2 GM/DL
GLUCOSE BLD-MCNC: 112 MG/DL (ref 65–99)
HCT VFR BLD AUTO: 30 % (ref 34–46.6)
HGB BLD-MCNC: 8.8 G/DL (ref 12–15.9)
IMM GRANULOCYTES # BLD AUTO: 0.01 10*3/MM3 (ref 0–0.05)
IMM GRANULOCYTES NFR BLD AUTO: 0.2 % (ref 0–0.5)
LYMPHOCYTES # BLD AUTO: 0.87 10*3/MM3 (ref 0.7–3.1)
LYMPHOCYTES NFR BLD AUTO: 14.5 % (ref 19.6–45.3)
MCH RBC QN AUTO: 24.6 PG (ref 26.6–33)
MCHC RBC AUTO-ENTMCNC: 29.3 G/DL (ref 31.5–35.7)
MCV RBC AUTO: 83.8 FL (ref 79–97)
MONOCYTES # BLD AUTO: 0.8 10*3/MM3 (ref 0.1–0.9)
MONOCYTES NFR BLD AUTO: 13.3 % (ref 5–12)
NEUTROPHILS # BLD AUTO: 4.14 10*3/MM3 (ref 1.4–7)
NEUTROPHILS NFR BLD AUTO: 69.1 % (ref 42.7–76)
NT-PROBNP SERPL-MCNC: 3628 PG/ML (ref 5–1800)
PLATELET # BLD AUTO: 338 10*3/MM3 (ref 140–450)
PMV BLD AUTO: 10.2 FL (ref 6–12)
POTASSIUM BLD-SCNC: 4.3 MMOL/L (ref 3.5–5.2)
PROT SERPL-MCNC: 6.8 G/DL (ref 6–8.5)
RBC # BLD AUTO: 3.58 10*6/MM3 (ref 3.77–5.28)
SODIUM BLD-SCNC: 142 MMOL/L (ref 136–145)
WBC NRBC COR # BLD: 6 10*3/MM3 (ref 3.4–10.8)

## 2019-04-18 PROCEDURE — 80053 COMPREHEN METABOLIC PANEL: CPT

## 2019-04-18 PROCEDURE — 83880 ASSAY OF NATRIURETIC PEPTIDE: CPT

## 2019-04-18 PROCEDURE — 85025 COMPLETE CBC W/AUTO DIFF WBC: CPT

## 2019-04-24 ENCOUNTER — OFFICE VISIT (OUTPATIENT)
Dept: CARDIOLOGY | Facility: CLINIC | Age: 84
End: 2019-04-24

## 2019-04-24 VITALS
DIASTOLIC BLOOD PRESSURE: 52 MMHG | WEIGHT: 92.8 LBS | HEIGHT: 62 IN | SYSTOLIC BLOOD PRESSURE: 112 MMHG | HEART RATE: 72 BPM | BODY MASS INDEX: 17.08 KG/M2

## 2019-04-24 DIAGNOSIS — I45.9 CONDUCTION DISORDER OF THE HEART: ICD-10-CM

## 2019-04-24 DIAGNOSIS — I10 ESSENTIAL HYPERTENSION: ICD-10-CM

## 2019-04-24 DIAGNOSIS — I48.0 PAROXYSMAL ATRIAL FIBRILLATION (HCC): Primary | ICD-10-CM

## 2019-04-24 DIAGNOSIS — E78.2 MIXED HYPERLIPIDEMIA: ICD-10-CM

## 2019-04-24 PROCEDURE — 93280 PM DEVICE PROGR EVAL DUAL: CPT | Performed by: INTERNAL MEDICINE

## 2019-04-24 PROCEDURE — 99214 OFFICE O/P EST MOD 30 MIN: CPT | Performed by: INTERNAL MEDICINE

## 2019-04-24 RX ORDER — MELATONIN
2000 DAILY
COMMUNITY

## 2019-04-24 RX ORDER — FUROSEMIDE 40 MG/1
40 TABLET ORAL DAILY
COMMUNITY
Start: 2019-04-19

## 2019-04-24 RX ORDER — POTASSIUM CHLORIDE 20 MEQ/1
20 TABLET, EXTENDED RELEASE ORAL DAILY
COMMUNITY
Start: 2019-04-19

## 2019-04-24 NOTE — PROGRESS NOTES
Kristina GALVAN Muhammad  12/3/1927  89 y.o.  [phone]      Date: 04/24/2019    PCP: Daniel Macias MD    Chief Complaint   Patient presents with   • Paroxysmal atrial fibrillation (CMS/HCC)       Problem List:  1. Atrial fibrillation:  a. Abnormal EKG, 06/19/2012: Atrial fibrillation at a rate of 104.  b. Initiation of metoprolol  ER 25 mg on 06/19/2012.  c. EKG, 07/09/2012: Sinus bradycardia with a first-degree AV block and a RBBB at a rate of 46.  2.  Conduction system disease:  a. Third-degree AV block, 08/31/2014.  b. Presentation to [facility] where she had implantation of [employer] medical Assurity  2240 pacemaker, data deficit.    c. Cardiolite PET scan, 09/11/2014: EF 70% with no evidence of ischemia.    d. Echocardiogram, 09/01/2014, Dr. Montelongo: EF 77% with moderate MR.  3. Hypertension.  4. Hyperlipidemia.  5. Hypothyroidism, on chronic replacement therapy.  6. Osteoporosis.  7. Dermatomyositis.  8. Depression.  9. Surgical history:  a. S/p right colectomy in 1991.  b. S/p basal cell carcinoma removal from the face.    Allergies   Allergen Reactions   • Propafenone      150 mg every 12 hours, fatigue.       Current Medications:      Current Outpatient Medications:   •  Acetaminophen (TYLENOL ARTHRITIS PAIN PO), Take 1 tablet by mouth As Needed., Disp: , Rfl:   •  amLODIPine (NORVASC) 2.5 MG tablet, Take 2.5 mg by mouth Daily., Disp: , Rfl:   •  aspirin 81 MG EC tablet, Take 81 mg by mouth Daily., Disp: , Rfl:   •  calcitonin, salmon, (MIACALCIN) 200 UNIT/ACT nasal spray, 1 spray by Alternating Nares route Daily., Disp: , Rfl:   •  cholecalciferol (VITAMIN D3) 1000 units tablet, Take 2,000 Units by mouth Daily., Disp: , Rfl:   •  donepezil (ARICEPT) 5 MG tablet, Take 5 mg by mouth Every Night., Disp: , Rfl:   •  furosemide (LASIX) 40 MG tablet, Take 40 mg by mouth Daily., Disp: , Rfl:   •  levothyroxine (SYNTHROID, LEVOTHROID) 75 MCG tablet, TK 1 T PO QD, Disp: , Rfl: 3  •  losartan (COZAAR)  "50 MG tablet, Take 50 mg by mouth Daily., Disp: , Rfl:   •  Multiple Minerals-Vitamins (CITRACAL PLUS BONE DENSITY PO), Take by mouth daily, Disp: , Rfl:   •  Multiple Vitamins-Minerals (CENTRUM SILVER ADULT 50+) tablet, Take by mouth daily, Disp: , Rfl:   •  Multiple Vitamins-Minerals (PRESERVISION AREDS PO), Take  by mouth 2 (Two) Times a Day., Disp: , Rfl:   •  polyethylene glycol (MIRALAX) packet, Take 17 g by mouth Daily., Disp: , Rfl:   •  potassium chloride (K-DUR,KLOR-CON) 20 MEQ CR tablet, Take 20 mEq by mouth Daily., Disp: , Rfl:   •  Probiotic Product (ALIGN PO), Take  by mouth Daily., Disp: , Rfl:   •  XARELTO 15 MG tablet, TAKE 1 TABLET BY MOUTH DAILY, Disp: 90 tablet, Rfl: 3    HPI    Kristina Muhammad is a 89 y.o. female who presents today for 6 month follow up of atrial fibrillation, conduction system disease, mitral regurgitation, hypertension, and hyperlipidemia. Since last visit, she has continued to lose some weight. She recently had to bandage her legs due to weeping edema. She was also started on Lasix 40 mg last week by Dr. Macias.. Patient has otherwise been doing well overall from a cardiovascular standpoint and denies chest pain, palpitations, shortness of breath, dizziness, and syncope.     The following portions of the patient's history were reviewed and updated as appropriate: allergies, current medications and problem list.    Pertinent positives as listed in the HPI.  All other systems reviewed are negative.    Vitals:    04/24/19 1508 04/24/19 1515   BP: 104/52 112/52   BP Location: Left arm Right arm   Patient Position: Sitting Sitting   Pulse: 72    Weight: 42.1 kg (92 lb 12.8 oz)    Height: 157.5 cm (62\")        Physical Exam:    General: Alert and oriented.  Neck: Jugular venous pressure is within normal limits. Carotids have normal upstrokes without bruits.   Cardiovascular: Heart has a nondisplaced focal PMI. Regular rate and rhythm without murmur, gallop or rub.  Lungs: Clear " without rales or wheezes. Equal expansion is noted.   Extremities: Show no edema.  Skin: Warm and dry.  Neurologic: Nonfocal.    Diagnostic Data:  Lab Results   Component Value Date    GLUCOSE 112 (H) 04/18/2019    BUN 17 04/18/2019    CREATININE 0.87 04/18/2019    EGFRIFNONA 61 04/18/2019    BCR 19.5 04/18/2019     04/18/2019    K 4.3 04/18/2019     04/18/2019    CO2 26.0 04/18/2019    CALCIUM 8.6 04/18/2019    ALBUMIN 3.60 04/18/2019    AST 16 04/18/2019    ALT 13 04/18/2019     Lab Results   Component Value Date    WBC 6.00 04/18/2019    HGB 8.8 (L) 04/18/2019    HCT 30.0 (L) 04/18/2019    MCV 83.8 04/18/2019     04/18/2019       Procedures     MANUAL DEVICE INTERROGATION: 4/24/2019, [employer] PPM:   RA pacing 1.9%, RV pacing >99%.  P wave is 1.0 mV Afib with an impedance of 350 ohms.   R wave is paced at 40 bpmwith a threshold of 0.5 V at 0.5 msec and an impedance of 600 ohms.  Battery voltage is 11 years.  Events: 98% AMS since October 18.   Reprogramming/software update: VVIR at 70 and she has been in A. fib essentially continuously since October.    Assessment:      ICD-10-CM ICD-9-CM   1. Paroxysmal atrial fibrillation (CMS/HCC) I48.0 427.31   2. Conduction system disease I45.9 426.9   3. Essential hypertension I10 401.9   4. Mixed hyperlipidemia E78.2 272.2     Lab results and device interrogation found above were reviewed with the patient.    Plan:    1. BMP in a week with Dr. Macias as patient was recently added on Lasix.  2. Increase caloric and water intake.  3. Continue Xarelto 15 mg for stroke prophylaxis with PAF.   4. Continue losartan and amlodipine for hypertension.  5. Continue Lasix 40 mg for edema.  6. Continue all other current medications.  7. F/up in 6 months with device check, or sooner if needed.    Scribed for Lori Mejia MD by Dixie Denney. 4/24/2019  3:16 PM     I Lori Mejia MD personally performed the services described in this documentation as  scribed by the above individual in my presence, and it is both accurate and complete.    Lori Mejia MD, FACC

## 2019-05-03 ENCOUNTER — LAB REQUISITION (OUTPATIENT)
Dept: LAB | Facility: HOSPITAL | Age: 84
End: 2019-05-03

## 2019-05-03 DIAGNOSIS — Z00.00 ROUTINE GENERAL MEDICAL EXAMINATION AT A HEALTH CARE FACILITY: ICD-10-CM

## 2019-05-03 LAB
ANION GAP SERPL CALCULATED.3IONS-SCNC: 10 MMOL/L
BUN BLD-MCNC: 31 MG/DL (ref 8–23)
BUN/CREAT SERPL: 34.1 (ref 7–25)
CALCIUM SPEC-SCNC: 9.1 MG/DL (ref 8.2–9.6)
CHLORIDE SERPL-SCNC: 103 MMOL/L (ref 98–107)
CO2 SERPL-SCNC: 28 MMOL/L (ref 22–29)
CREAT BLD-MCNC: 0.91 MG/DL (ref 0.57–1)
DEPRECATED RDW RBC AUTO: 47.1 FL (ref 37–54)
ERYTHROCYTE [DISTWIDTH] IN BLOOD BY AUTOMATED COUNT: 16 % (ref 12.3–15.4)
GFR SERPL CREATININE-BSD FRML MDRD: 58 ML/MIN/1.73
GLUCOSE BLD-MCNC: 98 MG/DL (ref 65–99)
HCT VFR BLD AUTO: 29.9 % (ref 34–46.6)
HGB BLD-MCNC: 8.9 G/DL (ref 12–15.9)
MCH RBC QN AUTO: 24.2 PG (ref 26.6–33)
MCHC RBC AUTO-ENTMCNC: 29.8 G/DL (ref 31.5–35.7)
MCV RBC AUTO: 81.3 FL (ref 79–97)
PLATELET # BLD AUTO: 336 10*3/MM3 (ref 140–450)
PMV BLD AUTO: 9.7 FL (ref 6–12)
POTASSIUM BLD-SCNC: 4.8 MMOL/L (ref 3.5–5.2)
RBC # BLD AUTO: 3.68 10*6/MM3 (ref 3.77–5.28)
SODIUM BLD-SCNC: 141 MMOL/L (ref 136–145)
WBC NRBC COR # BLD: 5.74 10*3/MM3 (ref 3.4–10.8)

## 2019-05-03 PROCEDURE — 85027 COMPLETE CBC AUTOMATED: CPT

## 2019-05-03 PROCEDURE — 80048 BASIC METABOLIC PNL TOTAL CA: CPT

## 2019-05-29 ENCOUNTER — CLINICAL SUPPORT NO REQUIREMENTS (OUTPATIENT)
Dept: CARDIOLOGY | Facility: CLINIC | Age: 84
End: 2019-05-29

## 2019-05-29 DIAGNOSIS — I48.0 PAROXYSMAL ATRIAL FIBRILLATION (HCC): ICD-10-CM

## 2019-05-29 PROCEDURE — 93296 REM INTERROG EVL PM/IDS: CPT | Performed by: INTERNAL MEDICINE

## 2019-05-29 PROCEDURE — 93294 REM INTERROG EVL PM/LDLS PM: CPT | Performed by: INTERNAL MEDICINE

## 2019-08-28 ENCOUNTER — CLINICAL SUPPORT NO REQUIREMENTS (OUTPATIENT)
Dept: CARDIOLOGY | Facility: CLINIC | Age: 84
End: 2019-08-28

## 2019-08-28 DIAGNOSIS — I45.9 CONDUCTION DISORDER OF THE HEART: ICD-10-CM

## 2019-08-28 DIAGNOSIS — I48.0 PAROXYSMAL ATRIAL FIBRILLATION (HCC): ICD-10-CM

## 2019-08-28 PROCEDURE — 93296 REM INTERROG EVL PM/IDS: CPT | Performed by: INTERNAL MEDICINE

## 2019-08-28 PROCEDURE — 93294 REM INTERROG EVL PM/LDLS PM: CPT | Performed by: INTERNAL MEDICINE

## 2019-10-31 NOTE — PROGRESS NOTES
[facility]  Kristina Muhammad  12/3/1927  89 y.o.  [phone]      Date: 10/31/2019    PCP: Daniel Macias MD    Chief Complaint   Patient presents with   • Atrial Fibrillation       Problem List:  1. Atrial fibrillation:  a. Abnormal EKG, 06/19/2012: Atrial fibrillation at a rate of 104.  b. Initiation of metoprolol  ER 25 mg on 06/19/2012.  c. EKG, 07/09/2012: Sinus bradycardia with a first-degree AV block and a RBBB at a rate of 46.  2.  Conduction system disease:  a. Third-degree AV block, 08/31/2014.  b. Presentation to [facility] where she had implantation of [employer] medical Assurity  2240 pacemaker, data deficit.    c. Cardiolite PET scan, 09/11/2014: EF 70% with no evidence of ischemia.    d. Echocardiogram, 09/01/2014, Dr. Montelongo: EF 77% with moderate MR.  3. Hypertension.  4. Hyperlipidemia.  5. Hypothyroidism, on chronic replacement therapy.  6. Osteoporosis.  7. Dermatomyositis.  8. Depression.  9. Surgical history:  a. S/p right colectomy in 1991.  b. S/p basal cell carcinoma removal from the face.    Allergies   Allergen Reactions   • Propafenone      150 mg every 12 hours, fatigue.       Current Medications:      Current Outpatient Medications:   •  Acetaminophen (TYLENOL ARTHRITIS PAIN PO), Take 1 tablet by mouth As Needed., Disp: , Rfl:   •  amLODIPine (NORVASC) 2.5 MG tablet, Take 2.5 mg by mouth Daily., Disp: , Rfl:   •  aspirin 81 MG EC tablet, Take 81 mg by mouth Daily., Disp: , Rfl:   •  calcitonin, salmon, (MIACALCIN) 200 UNIT/ACT nasal spray, 1 spray by Alternating Nares route Daily., Disp: , Rfl:   •  cholecalciferol (VITAMIN D3) 1000 units tablet, Take 2,000 Units by mouth Daily., Disp: , Rfl:   •  donepezil (ARICEPT) 5 MG tablet, Take 5 mg by mouth Every Night., Disp: , Rfl:   •  furosemide (LASIX) 40 MG tablet, Take 40 mg by mouth Daily., Disp: , Rfl:   •  levothyroxine (SYNTHROID, LEVOTHROID) 75 MCG tablet, TK 1 T PO QD, Disp:  ", Rfl: 3  •  losartan (COZAAR) 50 MG tablet, Take 50 mg by mouth Daily., Disp: , Rfl:   •  Multiple Minerals-Vitamins (CITRACAL PLUS BONE DENSITY PO), Take by mouth daily, Disp: , Rfl:   •  Multiple Vitamins-Minerals (CENTRUM SILVER ADULT 50+) tablet, Take by mouth daily, Disp: , Rfl:   •  Multiple Vitamins-Minerals (PRESERVISION AREDS PO), Take  by mouth 2 (Two) Times a Day., Disp: , Rfl:   •  polyethylene glycol (MIRALAX) packet, Take 17 g by mouth Daily., Disp: , Rfl:   •  potassium chloride (K-DUR,KLOR-CON) 20 MEQ CR tablet, Take 20 mEq by mouth Daily., Disp: , Rfl:   •  Probiotic Product (ALIGN PO), Take  by mouth Daily., Disp: , Rfl:   •  XARELTO 15 MG tablet, TAKE 1 TABLET BY MOUTH DAILY, Disp: 90 tablet, Rfl: 3    HPI    Kristina Muhammad is a 89 y.o. female who presents today for 6 month follow up of paroxysmal atrial fibrillation, third-degree AV block s/p pacemaker, hypertension, and hyperlipidemia. Since last visit, she has been feeling well overall from a cardiovascular standpoint. Patient denies chest pain, palpitations, shortness of breath, edema, dizziness, and syncope.        The following portions of the patient's history were reviewed and updated as appropriate: allergies, current medications and problem list.    Pertinent positives as listed in the HPI.  All other systems reviewed are negative.    Vitals:    10/31/19 1422   BP: 128/68   BP Location: Left arm   Patient Position: Sitting   Pulse: 59   SpO2: 96%   Weight: 45.4 kg (100 lb)   Height: 157.5 cm (62\")       Physical Exam:    General: Alert and oriented.  Neck: Jugular venous pressure is within normal limits. Carotids have normal upstrokes without bruits.   Cardiovascular: Heart has a nondisplaced focal PMI. Regular rate and rhythm without murmur, gallop or rub.  Lungs: Clear without rales or wheezes. Equal expansion is noted.   Extremities: Show no edema.  Skin: Warm and dry.  Neurologic: Nonfocal.    Diagnostic Data:  Lab Results "   Component Value Date    GLUCOSE 98 05/03/2019    BUN 31 (H) 05/03/2019    CREATININE 0.91 05/03/2019    EGFRIFNONA 58 (L) 05/03/2019    BCR 34.1 (H) 05/03/2019     05/03/2019    K 4.8 05/03/2019     05/03/2019    CO2 28.0 05/03/2019    CALCIUM 9.1 05/03/2019    ALBUMIN 3.60 04/18/2019    AST 16 04/18/2019    ALT 13 04/18/2019      Lab Results   Component Value Date    WBC 5.74 05/03/2019    HGB 8.9 (L) 05/03/2019    HCT 29.9 (L) 05/03/2019    MCV 81.3 05/03/2019     05/03/2019       Procedures     MANUAL DEVICE INTERROGATION: 10/31/2019, [employer] pacemaker:   RV pacing > 99%.  R wave is paced at VVI 40 with a threshold of 0.5 V at 0.5 msec and an impedance of 610 ohms.  Battery voltage is 12 years.  Events: None recorded. Permanent A fib, no RVR.  She is pacemaker dependent.    Assessment:      ICD-10-CM ICD-9-CM   1. Paroxysmal atrial fibrillation (CMS/HCC) I48.0 427.31   2. Conduction system disease I45.9 426.9   3. Essential hypertension I10 401.9   4. Mixed hyperlipidemia E78.2 272.2     Lab results and device interrogation found above were reviewed with the patient.    Plan:    1. Continue amlodipine, losartan, and Lasix for hypertension and fluid retention.  2. Continue Xarelto 15 mg for stroke prophylaxis with PAF.  3. Continue all other current medications.  4. F/up in 6 months with [employer] device check, or sooner if needed.      Scribed for Lori Mejia MD by Dixie Denney. 10/31/2019  2:31 PM     I Lori Mejia MD personally performed the services described in this documentation as scribed by the above individual in my presence, and it is both accurate and complete.    Lori Mejia MD, [facility]

## 2019-11-05 NOTE — TELEPHONE ENCOUNTER
TUAN WITH NURSE ON CALL HH CALLED NEEDING TO GET CLARIFICATION ON THE DIAGNOSIS FOR THE [place]. THE NOTE SAYS HTN/CHS;COMPINSATED ARB/LASIX.  THEY NEED TO KNOW WHAT KIND OF HEART FAILURE SHE HAS.

## 2020-01-01 ENCOUNTER — CLINICAL SUPPORT NO REQUIREMENTS (OUTPATIENT)
Dept: CARDIOLOGY | Facility: CLINIC | Age: 85
End: 2020-01-01

## 2020-01-01 ENCOUNTER — LAB REQUISITION (OUTPATIENT)
Dept: LAB | Facility: HOSPITAL | Age: 85
End: 2020-01-01

## 2020-01-01 ENCOUNTER — TELEPHONE (OUTPATIENT)
Dept: INTERNAL MEDICINE | Facility: CLINIC | Age: 85
End: 2020-01-01

## 2020-01-01 DIAGNOSIS — Z00.00 ROUTINE GENERAL MEDICAL EXAMINATION AT A HEALTH CARE FACILITY: ICD-10-CM

## 2020-01-01 DIAGNOSIS — I44.2 COMPLETE HEART BLOCK (HCC): ICD-10-CM

## 2020-01-01 DIAGNOSIS — I48.21 PERMANENT ATRIAL FIBRILLATION (HCC): Primary | ICD-10-CM

## 2020-01-01 LAB
ALBUMIN SERPL-MCNC: 4 G/DL (ref 3.5–5.2)
ALBUMIN/GLOB SERPL: 1.1 G/DL
ALP SERPL-CCNC: 124 U/L (ref 39–117)
ALT SERPL W P-5'-P-CCNC: 17 U/L (ref 1–33)
AMORPH URATE CRY URNS QL MICRO: ABNORMAL /HPF
ANION GAP SERPL CALCULATED.3IONS-SCNC: 13 MMOL/L (ref 5–15)
AST SERPL-CCNC: 25 U/L (ref 1–32)
BACTERIA UR QL AUTO: ABNORMAL /HPF
BACTERIA UR QL AUTO: ABNORMAL /HPF
BASOPHILS # BLD AUTO: 0.04 10*3/MM3 (ref 0–0.2)
BASOPHILS NFR BLD AUTO: 0.5 % (ref 0–1.5)
BILIRUB SERPL-MCNC: 0.6 MG/DL (ref 0.2–1.2)
BILIRUB UR QL STRIP: NEGATIVE
BILIRUB UR QL STRIP: NEGATIVE
BUN BLD-MCNC: 26 MG/DL (ref 8–23)
BUN/CREAT SERPL: 25 (ref 7–25)
CALCIUM SPEC-SCNC: 9.1 MG/DL (ref 8.2–9.6)
CHLORIDE SERPL-SCNC: 102 MMOL/L (ref 98–107)
CLARITY UR: ABNORMAL
CLARITY UR: CLEAR
CO2 SERPL-SCNC: 23 MMOL/L (ref 22–29)
COD CRY URNS QL: ABNORMAL /HPF
COLOR UR: YELLOW
COLOR UR: YELLOW
CREAT BLD-MCNC: 1.04 MG/DL (ref 0.57–1)
DEPRECATED RDW RBC AUTO: 53.3 FL (ref 37–54)
EOSINOPHIL # BLD AUTO: 0.01 10*3/MM3 (ref 0–0.4)
EOSINOPHIL NFR BLD AUTO: 0.1 % (ref 0.3–6.2)
ERYTHROCYTE [DISTWIDTH] IN BLOOD BY AUTOMATED COUNT: 17.7 % (ref 12.3–15.4)
GFR SERPL CREATININE-BSD FRML MDRD: 50 ML/MIN/1.73
GLOBULIN UR ELPH-MCNC: 3.8 GM/DL
GLUCOSE BLD-MCNC: 101 MG/DL (ref 65–99)
GLUCOSE UR STRIP-MCNC: NEGATIVE MG/DL
GLUCOSE UR STRIP-MCNC: NEGATIVE MG/DL
HCT VFR BLD AUTO: 36.5 % (ref 34–46.6)
HGB BLD-MCNC: 11 G/DL (ref 12–15.9)
HGB UR QL STRIP.AUTO: ABNORMAL
HGB UR QL STRIP.AUTO: NEGATIVE
HYALINE CASTS UR QL AUTO: ABNORMAL /LPF
HYALINE CASTS UR QL AUTO: ABNORMAL /LPF
IMM GRANULOCYTES # BLD AUTO: 0.03 10*3/MM3 (ref 0–0.05)
IMM GRANULOCYTES NFR BLD AUTO: 0.4 % (ref 0–0.5)
KETONES UR QL STRIP: ABNORMAL
KETONES UR QL STRIP: NEGATIVE
LEUKOCYTE ESTERASE UR QL STRIP.AUTO: ABNORMAL
LEUKOCYTE ESTERASE UR QL STRIP.AUTO: NEGATIVE
LYMPHOCYTES # BLD AUTO: 0.76 10*3/MM3 (ref 0.7–3.1)
LYMPHOCYTES NFR BLD AUTO: 9.5 % (ref 19.6–45.3)
MCH RBC QN AUTO: 25.2 PG (ref 26.6–33)
MCHC RBC AUTO-ENTMCNC: 30.1 G/DL (ref 31.5–35.7)
MCV RBC AUTO: 83.5 FL (ref 79–97)
MONOCYTES # BLD AUTO: 1.16 10*3/MM3 (ref 0.1–0.9)
MONOCYTES NFR BLD AUTO: 14.6 % (ref 5–12)
NEUTROPHILS # BLD AUTO: 5.97 10*3/MM3 (ref 1.7–7)
NEUTROPHILS NFR BLD AUTO: 74.9 % (ref 42.7–76)
NITRITE UR QL STRIP: NEGATIVE
NITRITE UR QL STRIP: NEGATIVE
NRBC BLD AUTO-RTO: 0 /100 WBC (ref 0–0.2)
NT-PROBNP SERPL-MCNC: 4005 PG/ML (ref 5–1800)
PH UR STRIP.AUTO: 8.5 [PH] (ref 5–8)
PH UR STRIP.AUTO: <=5 [PH] (ref 5–8)
PLATELET # BLD AUTO: 255 10*3/MM3 (ref 140–450)
PMV BLD AUTO: 10.4 FL (ref 6–12)
POTASSIUM BLD-SCNC: 4.8 MMOL/L (ref 3.5–5.2)
PROT SERPL-MCNC: 7.8 G/DL (ref 6–8.5)
PROT UR QL STRIP: ABNORMAL
PROT UR QL STRIP: NEGATIVE
RBC # BLD AUTO: 4.37 10*6/MM3 (ref 3.77–5.28)
RBC # UR: ABNORMAL /HPF
RBC # UR: ABNORMAL /HPF
REF LAB TEST METHOD: ABNORMAL
REF LAB TEST METHOD: ABNORMAL
SODIUM BLD-SCNC: 138 MMOL/L (ref 136–145)
SP GR UR STRIP: 1.01 (ref 1–1.03)
SP GR UR STRIP: 1.02 (ref 1–1.03)
SQUAMOUS #/AREA URNS HPF: ABNORMAL /HPF
SQUAMOUS #/AREA URNS HPF: ABNORMAL /HPF
UROBILINOGEN UR QL STRIP: ABNORMAL
UROBILINOGEN UR QL STRIP: ABNORMAL
WBC NRBC COR # BLD: 7.97 10*3/MM3 (ref 3.4–10.8)
WBC UR QL AUTO: ABNORMAL /HPF
WBC UR QL AUTO: ABNORMAL /HPF

## 2020-01-01 PROCEDURE — 81001 URINALYSIS AUTO W/SCOPE: CPT

## 2020-01-01 PROCEDURE — 83880 ASSAY OF NATRIURETIC PEPTIDE: CPT

## 2020-01-01 PROCEDURE — 93294 REM INTERROG EVL PM/LDLS PM: CPT | Performed by: INTERNAL MEDICINE

## 2020-01-01 PROCEDURE — 85025 COMPLETE CBC W/AUTO DIFF WBC: CPT

## 2020-01-01 PROCEDURE — 80053 COMPREHEN METABOLIC PANEL: CPT

## 2020-01-01 PROCEDURE — 93296 REM INTERROG EVL PM/IDS: CPT | Performed by: INTERNAL MEDICINE

## 2020-06-05 NOTE — TELEPHONE ENCOUNTER
Kalya with the [facility] in [place] wanted to let Dr. Macias know the patient had been moved there from [place]. Kayla would like to know if Dr. Macias wanted to follow the patients or have the patients see Dr. De Luna    Please call and advise. Kayla's call back [phone]

## 2020-11-25 ENCOUNTER — TELEPHONE (OUTPATIENT)
Dept: CARDIOLOGY | Facility: CLINIC | Age: 85
End: 2020-11-25

## 2020-11-25 NOTE — TELEPHONE ENCOUNTER
Called to alert pt that her monitor did not send scheduled transmission. No answer so I left my name and number for her to return my call.